# Patient Record
Sex: MALE | Race: WHITE | NOT HISPANIC OR LATINO | URBAN - NONMETROPOLITAN AREA
[De-identification: names, ages, dates, MRNs, and addresses within clinical notes are randomized per-mention and may not be internally consistent; named-entity substitution may affect disease eponyms.]

---

## 2019-05-09 ENCOUNTER — IMPORTED ENCOUNTER (OUTPATIENT)
Dept: URBAN - NONMETROPOLITAN AREA CLINIC 1 | Facility: CLINIC | Age: 64
End: 2019-05-09

## 2019-05-09 PROBLEM — H52.03: Noted: 2019-05-09

## 2019-05-09 PROBLEM — H52.4: Noted: 2019-05-09

## 2019-05-09 PROBLEM — H43.813: Noted: 2019-05-09

## 2019-05-09 PROBLEM — H43.811: Noted: 2019-05-09

## 2019-05-09 PROCEDURE — 92014 COMPRE OPH EXAM EST PT 1/>: CPT

## 2019-05-09 PROCEDURE — 92015 DETERMINE REFRACTIVE STATE: CPT

## 2019-05-09 NOTE — PATIENT DISCUSSION
"Discussed floaters. BF rx given. No change.; 's Notes: ""KOURTNEY KAUFFMAN""   Works at [employer]. "

## 2022-04-15 ASSESSMENT — TONOMETRY
OS_IOP_MMHG: 17
OD_IOP_MMHG: 17

## 2022-04-15 ASSESSMENT — VISUAL ACUITY
OS_SC: 20/20
OD_SC: 20/20

## 2024-07-02 ENCOUNTER — NEW PATIENT (OUTPATIENT)
Dept: RURAL CLINIC 1 | Facility: CLINIC | Age: 69
End: 2024-07-02

## 2024-07-02 DIAGNOSIS — H52.03: ICD-10-CM

## 2024-07-02 DIAGNOSIS — H43.813: ICD-10-CM

## 2024-07-02 DIAGNOSIS — H52.4: ICD-10-CM

## 2024-07-02 PROCEDURE — 92015 DETERMINE REFRACTIVE STATE: CPT

## 2024-07-02 PROCEDURE — 92004 COMPRE OPH EXAM NEW PT 1/>: CPT

## 2024-07-02 ASSESSMENT — VISUAL ACUITY
OD_CC: 20/20-1
OS_CC: 20/40-1

## 2024-07-02 ASSESSMENT — TONOMETRY
OS_IOP_MMHG: 16
OD_IOP_MMHG: 16

## 2024-09-12 ENCOUNTER — CONTACT LENSES/GLASSES VISIT (OUTPATIENT)
Dept: RURAL CLINIC 1 | Facility: CLINIC | Age: 69
End: 2024-09-12

## 2024-09-12 DIAGNOSIS — H52.4: ICD-10-CM

## 2024-09-12 PROCEDURE — 92015 DETERMINE REFRACTIVE STATE: CPT | Mod: NC
